# Patient Record
Sex: MALE | Race: WHITE
[De-identification: names, ages, dates, MRNs, and addresses within clinical notes are randomized per-mention and may not be internally consistent; named-entity substitution may affect disease eponyms.]

---

## 2017-01-01 ENCOUNTER — HOSPITAL (OUTPATIENT)
Dept: OTHER | Age: 82
End: 2017-01-01
Attending: INTERNAL MEDICINE

## 2017-03-22 PROCEDURE — 81003 URINALYSIS AUTO W/O SCOPE: CPT | Performed by: INTERNAL MEDICINE

## 2017-05-23 ENCOUNTER — HOSPITAL (OUTPATIENT)
Dept: OTHER | Age: 82
End: 2017-05-23
Attending: INTERNAL MEDICINE

## 2017-06-01 ENCOUNTER — HOSPITAL (OUTPATIENT)
Dept: OTHER | Age: 82
End: 2017-06-01
Attending: INTERNAL MEDICINE

## 2017-07-01 ENCOUNTER — HOSPITAL (OUTPATIENT)
Dept: OTHER | Age: 82
End: 2017-07-01
Attending: INTERNAL MEDICINE

## 2017-08-01 ENCOUNTER — HOSPITAL (OUTPATIENT)
Dept: OTHER | Age: 82
End: 2017-08-01
Attending: INTERNAL MEDICINE

## 2017-08-25 ENCOUNTER — HOSPITAL (OUTPATIENT)
Dept: OTHER | Age: 82
End: 2017-08-25
Attending: INTERNAL MEDICINE

## 2017-09-01 ENCOUNTER — HOSPITAL (OUTPATIENT)
Dept: OTHER | Age: 82
End: 2017-09-01
Attending: INTERNAL MEDICINE

## 2017-09-28 PROBLEM — R09.02 HYPOXIA: Status: ACTIVE | Noted: 2017-09-28

## 2017-09-28 PROBLEM — M35.1 OVERLAP SYNDROME (HCC): Status: ACTIVE | Noted: 2017-09-28

## 2018-03-23 PROCEDURE — 87086 URINE CULTURE/COLONY COUNT: CPT | Performed by: INTERNAL MEDICINE

## 2018-03-23 PROCEDURE — 81001 URINALYSIS AUTO W/SCOPE: CPT | Performed by: INTERNAL MEDICINE

## 2019-07-30 PROCEDURE — 36415 COLL VENOUS BLD VENIPUNCTURE: CPT | Performed by: INTERNAL MEDICINE

## 2019-07-30 PROCEDURE — 86480 TB TEST CELL IMMUN MEASURE: CPT | Performed by: INTERNAL MEDICINE

## 2019-09-13 PROBLEM — E78.2 MIXED HYPERLIPIDEMIA: Status: ACTIVE | Noted: 2019-09-13

## 2019-09-13 PROBLEM — I65.23 BILATERAL CAROTID ARTERY STENOSIS: Status: ACTIVE | Noted: 2019-09-13

## 2019-09-13 PROBLEM — Z95.2 HX OF AORTIC VALVE REPLACEMENT: Status: ACTIVE | Noted: 2019-09-13

## 2021-07-14 ENCOUNTER — TELEPHONE (OUTPATIENT)
Dept: OTHER | Age: 86
End: 2021-07-14

## 2021-07-19 ENCOUNTER — TELEPHONE (OUTPATIENT)
Dept: OTHER | Age: 86
End: 2021-07-19

## 2021-07-22 ENCOUNTER — TELEPHONE (OUTPATIENT)
Dept: OTHER | Age: 86
End: 2021-07-22

## 2021-08-03 ENCOUNTER — TELEPHONE (OUTPATIENT)
Dept: INTERNAL MEDICINE | Age: 86
End: 2021-08-03

## 2021-10-11 ENCOUNTER — HOSPITAL ENCOUNTER (OUTPATIENT)
Facility: HOSPITAL | Age: 86
Setting detail: OBSERVATION
Discharge: NURSING, REHAB OR LONG-TERM CARE | End: 2021-10-13
Attending: EMERGENCY MEDICINE | Admitting: HOSPITALIST
Payer: MEDICARE

## 2021-10-11 ENCOUNTER — APPOINTMENT (OUTPATIENT)
Dept: CT IMAGING | Facility: HOSPITAL | Age: 86
End: 2021-10-11
Attending: EMERGENCY MEDICINE
Payer: MEDICARE

## 2021-10-11 DIAGNOSIS — R33.8 ACUTE URINARY RETENTION: ICD-10-CM

## 2021-10-11 DIAGNOSIS — K62.5 RECTAL BLEEDING: Primary | ICD-10-CM

## 2021-10-11 DIAGNOSIS — D64.9 ANEMIA, UNSPECIFIED TYPE: ICD-10-CM

## 2021-10-11 PROBLEM — R79.89 AZOTEMIA: Status: ACTIVE | Noted: 2021-10-11

## 2021-10-11 PROCEDURE — 86900 BLOOD TYPING SEROLOGIC ABO: CPT | Performed by: EMERGENCY MEDICINE

## 2021-10-11 PROCEDURE — 96374 THER/PROPH/DIAG INJ IV PUSH: CPT

## 2021-10-11 PROCEDURE — 99285 EMERGENCY DEPT VISIT HI MDM: CPT

## 2021-10-11 PROCEDURE — 74177 CT ABD & PELVIS W/CONTRAST: CPT | Performed by: EMERGENCY MEDICINE

## 2021-10-11 PROCEDURE — 83540 ASSAY OF IRON: CPT | Performed by: HOSPITALIST

## 2021-10-11 PROCEDURE — 83550 IRON BINDING TEST: CPT | Performed by: HOSPITALIST

## 2021-10-11 PROCEDURE — 86901 BLOOD TYPING SEROLOGIC RH(D): CPT | Performed by: EMERGENCY MEDICINE

## 2021-10-11 PROCEDURE — 83690 ASSAY OF LIPASE: CPT | Performed by: EMERGENCY MEDICINE

## 2021-10-11 PROCEDURE — 86920 COMPATIBILITY TEST SPIN: CPT

## 2021-10-11 PROCEDURE — 81001 URINALYSIS AUTO W/SCOPE: CPT | Performed by: EMERGENCY MEDICINE

## 2021-10-11 PROCEDURE — 93005 ELECTROCARDIOGRAM TRACING: CPT

## 2021-10-11 PROCEDURE — C9113 INJ PANTOPRAZOLE SODIUM, VIA: HCPCS | Performed by: EMERGENCY MEDICINE

## 2021-10-11 PROCEDURE — 82728 ASSAY OF FERRITIN: CPT | Performed by: HOSPITALIST

## 2021-10-11 PROCEDURE — 96361 HYDRATE IV INFUSION ADD-ON: CPT

## 2021-10-11 PROCEDURE — 86850 RBC ANTIBODY SCREEN: CPT | Performed by: EMERGENCY MEDICINE

## 2021-10-11 PROCEDURE — 93010 ELECTROCARDIOGRAM REPORT: CPT

## 2021-10-11 PROCEDURE — 51702 INSERT TEMP BLADDER CATH: CPT

## 2021-10-11 PROCEDURE — 85025 COMPLETE CBC W/AUTO DIFF WBC: CPT | Performed by: EMERGENCY MEDICINE

## 2021-10-11 PROCEDURE — 80053 COMPREHEN METABOLIC PANEL: CPT | Performed by: EMERGENCY MEDICINE

## 2021-10-11 RX ORDER — LIDOCAINE HYDROCHLORIDE 20 MG/ML
10 JELLY TOPICAL ONCE
Status: COMPLETED | OUTPATIENT
Start: 2021-10-11 | End: 2021-10-11

## 2021-10-11 RX ORDER — FINASTERIDE 5 MG/1
5 TABLET, FILM COATED ORAL DAILY
Status: DISCONTINUED | OUTPATIENT
Start: 2021-10-11 | End: 2021-10-13

## 2021-10-11 RX ORDER — FLUTICASONE PROPIONATE 50 MCG
2 SPRAY, SUSPENSION (ML) NASAL DAILY
Status: DISCONTINUED | OUTPATIENT
Start: 2021-10-12 | End: 2021-10-13

## 2021-10-11 RX ORDER — TAMSULOSIN HYDROCHLORIDE 0.4 MG/1
0.4 CAPSULE ORAL DAILY
Status: DISCONTINUED | OUTPATIENT
Start: 2021-10-11 | End: 2021-10-11

## 2021-10-11 RX ORDER — ONDANSETRON 2 MG/ML
4 INJECTION INTRAMUSCULAR; INTRAVENOUS EVERY 6 HOURS PRN
Status: DISCONTINUED | OUTPATIENT
Start: 2021-10-11 | End: 2021-10-13

## 2021-10-11 RX ORDER — METOCLOPRAMIDE HYDROCHLORIDE 5 MG/ML
10 INJECTION INTRAMUSCULAR; INTRAVENOUS EVERY 8 HOURS PRN
Status: DISCONTINUED | OUTPATIENT
Start: 2021-10-11 | End: 2021-10-13

## 2021-10-11 RX ORDER — CETIRIZINE HYDROCHLORIDE 10 MG/1
10 TABLET ORAL DAILY
Status: DISCONTINUED | OUTPATIENT
Start: 2021-10-12 | End: 2021-10-13

## 2021-10-11 RX ORDER — TAMSULOSIN HYDROCHLORIDE 0.4 MG/1
0.4 CAPSULE ORAL 2 TIMES DAILY
Status: DISCONTINUED | OUTPATIENT
Start: 2021-10-12 | End: 2021-10-13

## 2021-10-11 RX ORDER — FUROSEMIDE 20 MG/1
20 TABLET ORAL EVERY OTHER DAY
COMMUNITY
End: 2021-11-08

## 2021-10-11 RX ORDER — SODIUM CHLORIDE, SODIUM LACTATE, POTASSIUM CHLORIDE, CALCIUM CHLORIDE 600; 310; 30; 20 MG/100ML; MG/100ML; MG/100ML; MG/100ML
INJECTION, SOLUTION INTRAVENOUS CONTINUOUS
Status: DISCONTINUED | OUTPATIENT
Start: 2021-10-11 | End: 2021-10-12

## 2021-10-11 RX ORDER — ATORVASTATIN CALCIUM 20 MG/1
20 TABLET, FILM COATED ORAL NIGHTLY
Status: DISCONTINUED | OUTPATIENT
Start: 2021-10-11 | End: 2021-10-13

## 2021-10-11 RX ORDER — SODIUM CHLORIDE 9 MG/ML
INJECTION, SOLUTION INTRAVENOUS CONTINUOUS
Status: DISCONTINUED | OUTPATIENT
Start: 2021-10-11 | End: 2021-10-12

## 2021-10-11 RX ORDER — FUROSEMIDE 20 MG/1
20 TABLET ORAL EVERY OTHER DAY
Status: DISCONTINUED | OUTPATIENT
Start: 2021-10-11 | End: 2021-10-13

## 2021-10-11 RX ORDER — LIDOCAINE HYDROCHLORIDE 20 MG/ML
JELLY TOPICAL
Status: COMPLETED
Start: 2021-10-11 | End: 2021-10-11

## 2021-10-11 RX ORDER — SODIUM CHLORIDE 9 MG/ML
INJECTION, SOLUTION INTRAVENOUS CONTINUOUS
Status: DISCONTINUED | OUTPATIENT
Start: 2021-10-11 | End: 2021-10-11

## 2021-10-11 NOTE — ED QUICK NOTES
Orders for admission, patient is aware of plan and ready to go upstairs. Any questions, please call ED LAURA Melgar  at extension [phone]   Vaccinated?  yes  Type of COVID test sent: RAPID COID  COVID Suspicion level: Low/High-LOW      Titratable drug(s) infusing

## 2021-10-11 NOTE — PROGRESS NOTES
NURSING ADMISSION NOTE      Patient admitted via Cart  Oriented to room. Safety precautions initiated. Bed in low position. Call light in reach. Pt AOx4, anxious and at times, tearful.  Pt refused relief with medications but, was open to emotional

## 2021-10-11 NOTE — H&P
[facility]    History and Physical     Chary Guan Patient Status:  Emergency    1932 MRN [id number]   Location [address] Attending Jb Mcghee MD   Hosp Day # 0 PCP Rolando Phillip MD     Chief Compla smoking history. He has never used smokeless tobacco. He reports current alcohol use. He reports that he does not use drugs. Family History:   Family History   Family history unknown:  Yes       Allergies:   No Known Allergies        Seasonal MCG/ACT Inhalation Aerosol, Inhale 2 Puffs into the lungs 2 (two) times daily. , Disp: 1 Inhaler, Rfl: 2  METOPROLOL TARTRATE 50 MG OR TABS, Take 25 mg by mouth 2 (two) times daily. , Disp: , Rfl:   VITAMIN D 2000 UNIT OR TABS, 1 po daily, Disp: 30, Rfl: 0 in the last 168 hours. No results for input(s): TROP, CK in the last 168 hours. Imaging: Imaging data reviewed in Epic.       ASSESSMENT / PLAN:     Guero Tinajero Is a a 80year old male who presents with anemia    Problem List / Diagnoses    An

## 2021-10-11 NOTE — ED INITIAL ASSESSMENT (HPI)
A/o x3,pt with two BM yesterday with dark color in stool,  And distended abd.  Denies pain to abd but reports bloating

## 2021-10-11 NOTE — ED PROVIDER NOTES
Patient Seen in: [facility] Emergency Department      History   Patient presents with:  Abdomen/Flank Pain    Stated Complaint: GI Blood    Subjective:   HPI    80year-old male complaint of rectal bleeding patient had 2 bowel movements with large a limits neck there is no lymphadenopathy or JVD lungs are clear cardiovascular exam shows regular rate and rhythm without murmurs abdomen soft there is moderate tenderness and distention of the lower abdomen rectal exam showed dark red maroon-colored stool BUN 42 hemoglobin 8.3 previous hemoglobin from 2019 was 14      CT ABDOMEN+PELVIS(CONTRAST ONLY)(CPT=74177)    Result Date: 10/11/2021  CONCLUSION:   1. Marked centrilobular emphysematous changes are noted.   2. Moderate abnormal bladder wall thickening w

## 2021-10-12 PROCEDURE — 83550 IRON BINDING TEST: CPT | Performed by: HOSPITALIST

## 2021-10-12 PROCEDURE — 80048 BASIC METABOLIC PNL TOTAL CA: CPT | Performed by: HOSPITALIST

## 2021-10-12 PROCEDURE — 83540 ASSAY OF IRON: CPT | Performed by: HOSPITALIST

## 2021-10-12 PROCEDURE — 94640 AIRWAY INHALATION TREATMENT: CPT

## 2021-10-12 PROCEDURE — 96361 HYDRATE IV INFUSION ADD-ON: CPT

## 2021-10-12 PROCEDURE — 30233N1 TRANSFUSION OF NONAUTOLOGOUS RED BLOOD CELLS INTO PERIPHERAL VEIN, PERCUTANEOUS APPROACH: ICD-10-PCS | Performed by: INTERNAL MEDICINE

## 2021-10-12 PROCEDURE — 85018 HEMOGLOBIN: CPT | Performed by: HOSPITALIST

## 2021-10-12 PROCEDURE — 85025 COMPLETE CBC W/AUTO DIFF WBC: CPT | Performed by: HOSPITALIST

## 2021-10-12 PROCEDURE — 82728 ASSAY OF FERRITIN: CPT | Performed by: HOSPITALIST

## 2021-10-12 PROCEDURE — 36430 TRANSFUSION BLD/BLD COMPNT: CPT

## 2021-10-12 RX ORDER — SODIUM CHLORIDE 9 MG/ML
INJECTION, SOLUTION INTRAVENOUS ONCE
Status: COMPLETED | OUTPATIENT
Start: 2021-10-12 | End: 2021-10-12

## 2021-10-12 NOTE — PLAN OF CARE
Pt is A&Ox4. Anxious and tearful at times. Wears glasses, upper/lower dentures. VSS, afebrile. Maintaining O2 sats >95% on 3L NC- baseline. ASHKAN- cpap at [place] [address]. Tele, NSR-ST. Last BM 10/10. NPO at midnight, ice chips. Huang in place, draining WDL.  Up SBA w/ c

## 2021-10-12 NOTE — PHYSICAL THERAPY NOTE
Order received for PT eval and chart reviewed. Attempted to see Pt this am for PT session, however, Pt adamantly refusing OOB activity and PT eval despite maximal encouragement and education on role of therapy.  Will continue to follow if patient participat

## 2021-10-12 NOTE — PROGRESS NOTES
[facility]    Progress Note     Annemarie Simon Patient Status:  Observation    1932 MRN [id number]   [facility] 5NW-A Attending Cindy Huynh MD   Hosp Day # 0 PCP Meri Shepherd MD     Chief Complaint: Patient presents w Oral Daily   • tamsulosin  0.4 mg Oral BID   • Umeclidinium Bromide  1 puff Inhalation Daily   • melatonin  5 mg Oral Nightly       ASSESSMENT / PLAN:        Chary Guan Is a a 80year old male who presents with anemia     Problem List / Diagnoses

## 2021-10-12 NOTE — OCCUPATIONAL THERAPY NOTE
Attempted to see patient for OT evaluation this am, however patient is adamantly refusing at this time despite extensive explanation regarding role and POC. Will continue to follow if patient becomes an active participant. RN notified.

## 2021-10-12 NOTE — PROGRESS NOTES
10/12/21 1739   Clinical Encounter Type   Visited With Patient   Continue Visiting No   Patient Spiritual Encounters   Spiritual Needs Pt. is fearful of medical outcomes, has guilt due to youthful indisgressions, and is concerned if his prayers he uses

## 2021-10-12 NOTE — DIETARY NOTE
[facility]    NUTRITION ASSESSMENT    Pt does not meet malnutrition criteria. NUTRITION INTERVENTION:    1. Meal and Snacks - Advance diet as able to general, monitor patient po intake. Encourage adequate po of appropriate diet.   2. Medical Food rib cage region and moderate depletion muscle mass temple region, clavicle region, thigh region and calf region      2.  Fluid Accumulation: none per RN documentation     NUTRITION PRESCRIPTION:   Calories: 3505-3780 calories/day (25-30 calories per kg)  Pr

## 2021-10-12 NOTE — PROGRESS NOTES
Problem: Rectal Bleeding     Data: Pt AOx4, anxious and at times, tearful.  on 3 L which is pt's baseline at home. NSR to ST on tele. Huang in place draining becki urine. No complaints of pain, nausea or vomiting.  Pt did complain of bloody stool two day

## 2021-10-12 NOTE — CONSULTS
[facility]    Report of Consultation    Collettruby Castanocollin Patient Status:  Observation    1932 MRN [id number]   [facility] 5NW-A Attending Janina Goltz, MD   Hosp Day # 0 PCP Brady Hardy MD     Date of Admission:  10/11 spray, Each Nare, Daily  •  furosemide (LASIX) tab 20 mg, 20 mg, Oral, QOD  •  cetirizine (ZYRTEC) tab 10 mg, 10 mg, Oral, Daily  •  tamsulosin (FLOMAX) cap 0.4 mg, 0.4 mg, Oral, BID  •  Umeclidinium Bromide (INCRUSE ELLIPTA) 62.5 MCG/INH inhaler 1 puff, 1 other lower urinary tract symptoms (LUTS)     Essential hypertension, benign     Congenital atresia and stenosis of aorta     Gingivitis     Acute sinusitis, unspecified     Obstructive sleep apnea (adult) (pediatric)     Shortness of breath     Overlap sy

## 2021-10-12 NOTE — CM/SW NOTE
10/12/21 1600   CM/SW Referral Data   Referral Source    Reason for Referral Discharge planning   Informant Patient; Spouse/Significant Other   Patient Info   Patient's Current Mental Status at Time of Assessment Alert; Oriented   Patient's

## 2021-10-13 ENCOUNTER — HOSPITAL ENCOUNTER (EMERGENCY)
Facility: HOSPITAL | Age: 86
Discharge: HOME | End: 2021-10-13
Attending: EMERGENCY MEDICINE
Payer: MEDICARE

## 2021-10-13 VITALS
WEIGHT: 113 LBS | RESPIRATION RATE: 22 BRPM | OXYGEN SATURATION: 98 % | DIASTOLIC BLOOD PRESSURE: 42 MMHG | HEART RATE: 101 BPM | TEMPERATURE: 98 F | SYSTOLIC BLOOD PRESSURE: 140 MMHG | HEIGHT: 66 IN | BODY MASS INDEX: 18.16 KG/M2

## 2021-10-13 VITALS
OXYGEN SATURATION: 94 % | RESPIRATION RATE: 20 BRPM | SYSTOLIC BLOOD PRESSURE: 123 MMHG | TEMPERATURE: 99 F | HEIGHT: 66 IN | DIASTOLIC BLOOD PRESSURE: 54 MMHG | BODY MASS INDEX: 18.18 KG/M2 | WEIGHT: 113.13 LBS | HEART RATE: 90 BPM

## 2021-10-13 DIAGNOSIS — K62.5 RECTAL BLEEDING: Primary | ICD-10-CM

## 2021-10-13 PROCEDURE — 96361 HYDRATE IV INFUSION ADD-ON: CPT

## 2021-10-13 PROCEDURE — 80048 BASIC METABOLIC PNL TOTAL CA: CPT | Performed by: HOSPITALIST

## 2021-10-13 PROCEDURE — 99283 EMERGENCY DEPT VISIT LOW MDM: CPT | Performed by: EMERGENCY MEDICINE

## 2021-10-13 PROCEDURE — 36415 COLL VENOUS BLD VENIPUNCTURE: CPT | Performed by: EMERGENCY MEDICINE

## 2021-10-13 PROCEDURE — 85025 COMPLETE CBC W/AUTO DIFF WBC: CPT | Performed by: HOSPITALIST

## 2021-10-13 PROCEDURE — 80053 COMPREHEN METABOLIC PANEL: CPT | Performed by: HOSPITALIST

## 2021-10-13 PROCEDURE — 85025 COMPLETE CBC W/AUTO DIFF WBC: CPT | Performed by: EMERGENCY MEDICINE

## 2021-10-13 NOTE — DISCHARGE PLANNING
NURSING DISCHARGE NOTE    Discharged Home via Ambulance. Accompanied by Support staff  Belongings Taken by patient/family. Discharge navigator complete. Discharge instructions reviewed with patient & family at bedside.   All questions & concerns addre

## 2021-10-13 NOTE — DISCHARGE SUMMARY
General Medicine Discharge Summary     Patient ID:  Momo Vitale  80year old  9/9/1932    Admit date: 10/11/2021    Discharge date and time: 10/13/2021      Attending Physician: Isidra Mustafa exacerbation on exam     Urinary Retention   - already on tamsulosin/finasteride  - s/p IFC with 900cc return; given degree of bladder dilation will need at least 1 week of bladder decompression w/ IFC   - d/w Urology, will set up for void trial in ~1 week mouth 2 (two) times daily. VITAMIN D 2000 UNIT OR TABS  1 po daily    MUCINEX 600 MG OR TB12  1 TABLET DAILY    Ascorbic Acid (VITAMIN C OR)  Take 1,000 mg by mouth 2 (two) times a day.     nystatin 518141 UNIT/ML Mouth/Throat Suspension  Take 5 mL (500,

## 2021-10-13 NOTE — OCCUPATIONAL THERAPY NOTE
Attempted to see pt for OT. Pt adamantly refusing OOB activity, stating \"Now that they gave me this catheter I don't have to ever get up again. \" Will re-attempt to see pt as appropriate and as able.

## 2021-10-13 NOTE — CM/SW NOTE
CM met with pt and daughter to discuss discharge plan. Pt will discharge to [facility]. . Lucero RN to call report to Leanna SANCHEZ # [phone]. MD updated on plan.     Tonia Pollock, MSN RN, [address]  X [phone]

## 2021-10-13 NOTE — PLAN OF CARE
Pt aox4. Anxious. Upper and lower dentures. VSS on 3L. Tele ST. EP. No rectal bleeding noted. Hgb 9.1 this AM, improved from yesterday. BM on 10/10. Huang in place due to urinary retention. Draining well. Pt to follow up with urology on Friday.  Up with sta interventions  Outcome: Progressing

## 2021-10-13 NOTE — CM/SW NOTE
CM spoke with Dianna Scott at [facility] re patient needing his portable O2 situation remedied. They will contact the patient's PCP( Dr Altagracia Hatfield) to get an order for portable tanks to be delivered to the home.  CM will relay this info to patient

## 2021-10-13 NOTE — ED INITIAL ASSESSMENT (HPI)
PT TO ED FROM [facility], PT Kelly Mike FOR BLOODY STOOLS TO RETURN TO FACILITY.  WHEN PT RETURNED TO FACILITY HE REPORTS HAVING SMALL BM THAT HAD DARK RED BLOOD, [facility] RETURNED PT TO FACILITY CRISPIN

## 2021-10-13 NOTE — ED QUICK NOTES
THIS RN TALKED TO [facility] REPORTS PT WAS IN THE BATHROOM AND WAS \"STILL BLEEDING\", PT IS NOT APPROPRIATE FOR ASSISTED LIVING AND NEEDS A SKILLED UNIT, WHICH THEY HAVE. RN TRANSFERRED TO THE SKILLED UNIT.  RN INFORMED THAT THEY HAVE NO ADMI

## 2021-10-13 NOTE — PLAN OF CARE
Pt is A&Ox4. Anxious and tearful at times. Wears glasses, upper/lower dentures. VSS, afebrile. Maintaining O2 sats >95% on 3L NC- baseline. ASHKAN- cpap at [facility]. Tele, NSR-ST. Last BM 10/10. Clear liquid diet. Huang in place, draining WDL.  Up SBA w/ cane or wa

## 2021-10-13 NOTE — PROGRESS NOTES
[facility]    Progress Note    Yadira Murrieta Patient Status:  Observation    1932 MRN [id number]   [facility] 5NW-A Attending Dangelo Dixon MD   Hosp Day # 0 PCP Bryanna Wan MD     Subjective:  Yadiralydia Murrieta absolutely necessary.     Ok to discharge home today from a GI standpoint.       Jamaal Wood MD  10/13/2021  8:57 AM

## 2021-10-13 NOTE — CM/SW NOTE
CM completed PCS and placed EAS on will call for transport back to [facility] assisted living.  &  to remain available and supportive for discharge planning needs.     Alpa Tafoya RN Case Manager [phone]

## 2021-10-13 NOTE — ED PROVIDER NOTES
Patient Seen in: [facility] Emergency Department      History   Patient presents with: Other    Stated Complaint: placement     Subjective:   HPI    Patient presents here with rectal bleeding.   The patient was just discharged from the hospital toda - 1.0 standard drinks      Comment: very occasional    Drug use: No             Review of Systems    Positive for stated complaint: placement   Other systems are as noted in HPI. Constitutional and vital signs reviewed.       All other systems reviewed and Abnormality         Status                     ---------                               -----------         ------                     CBC W/ DIFFERENTIAL[[id number]]          Abnormal            Final result                 Please view results for these te

## 2021-10-13 NOTE — ED QUICK NOTES
[facility] called for transport; eta 60-90, requested alternative transport due to time.  Alternative transports 60+ minutes

## 2021-10-14 ENCOUNTER — SNF VISIT (OUTPATIENT)
Dept: INTERNAL MEDICINE CLINIC | Age: 86
End: 2021-10-14

## 2021-10-14 DIAGNOSIS — I50.9 OTHER CONGESTIVE HEART FAILURE (HCC): ICD-10-CM

## 2021-10-14 DIAGNOSIS — Z97.8 CHRONIC INDWELLING FOLEY CATHETER: ICD-10-CM

## 2021-10-14 DIAGNOSIS — K92.2 GASTROINTESTINAL HEMORRHAGE, UNSPECIFIED GASTROINTESTINAL HEMORRHAGE TYPE: ICD-10-CM

## 2021-10-14 DIAGNOSIS — R06.02 SOB (SHORTNESS OF BREATH): ICD-10-CM

## 2021-10-14 DIAGNOSIS — D50.0 IRON DEFICIENCY ANEMIA DUE TO CHRONIC BLOOD LOSS: Primary | ICD-10-CM

## 2021-10-14 DIAGNOSIS — R33.8 URINARY RETENTION DUE TO BENIGN PROSTATIC HYPERPLASIA: ICD-10-CM

## 2021-10-14 DIAGNOSIS — N40.1 URINARY RETENTION DUE TO BENIGN PROSTATIC HYPERPLASIA: ICD-10-CM

## 2021-10-14 DIAGNOSIS — F41.8 ANXIETY ABOUT HEALTH: ICD-10-CM

## 2021-10-14 DIAGNOSIS — J44.9 CHRONIC OBSTRUCTIVE PULMONARY DISEASE, UNSPECIFIED COPD TYPE (HCC): ICD-10-CM

## 2021-10-14 PROCEDURE — 3074F SYST BP LT 130 MM HG: CPT | Performed by: NURSE PRACTITIONER

## 2021-10-14 PROCEDURE — 3078F DIAST BP <80 MM HG: CPT | Performed by: NURSE PRACTITIONER

## 2021-10-14 PROCEDURE — 1123F ACP DISCUSS/DSCN MKR DOCD: CPT | Performed by: NURSE PRACTITIONER

## 2021-10-14 PROCEDURE — 99310 SBSQ NF CARE HIGH MDM 45: CPT | Performed by: NURSE PRACTITIONER

## 2021-10-21 ENCOUNTER — SNF VISIT (OUTPATIENT)
Dept: INTERNAL MEDICINE CLINIC | Age: 86
End: 2021-10-21

## 2021-10-21 ENCOUNTER — TELEPHONE (OUTPATIENT)
Dept: INTERNAL MEDICINE | Age: 86
End: 2021-10-21

## 2021-10-21 DIAGNOSIS — R33.8 URINARY RETENTION DUE TO BENIGN PROSTATIC HYPERPLASIA: ICD-10-CM

## 2021-10-21 DIAGNOSIS — F41.8 ANXIETY ABOUT HEALTH: ICD-10-CM

## 2021-10-21 DIAGNOSIS — I50.9 OTHER CONGESTIVE HEART FAILURE (HCC): ICD-10-CM

## 2021-10-21 DIAGNOSIS — F32.0 CURRENT MILD EPISODE OF MAJOR DEPRESSIVE DISORDER WITHOUT PRIOR EPISODE (HCC): ICD-10-CM

## 2021-10-21 DIAGNOSIS — K92.2 GASTROINTESTINAL HEMORRHAGE, UNSPECIFIED GASTROINTESTINAL HEMORRHAGE TYPE: ICD-10-CM

## 2021-10-21 DIAGNOSIS — N40.1 URINARY RETENTION DUE TO BENIGN PROSTATIC HYPERPLASIA: ICD-10-CM

## 2021-10-21 DIAGNOSIS — J44.9 CHRONIC OBSTRUCTIVE PULMONARY DISEASE, UNSPECIFIED COPD TYPE (HCC): ICD-10-CM

## 2021-10-21 DIAGNOSIS — D50.0 IRON DEFICIENCY ANEMIA DUE TO CHRONIC BLOOD LOSS: Primary | ICD-10-CM

## 2021-10-21 DIAGNOSIS — R06.02 SOB (SHORTNESS OF BREATH): ICD-10-CM

## 2021-10-21 DIAGNOSIS — Z97.8 CHRONIC INDWELLING FOLEY CATHETER: ICD-10-CM

## 2021-10-21 PROCEDURE — 99309 SBSQ NF CARE MODERATE MDM 30: CPT | Performed by: NURSE PRACTITIONER

## 2021-10-21 PROCEDURE — 3078F DIAST BP <80 MM HG: CPT | Performed by: NURSE PRACTITIONER

## 2021-10-21 PROCEDURE — 3074F SYST BP LT 130 MM HG: CPT | Performed by: NURSE PRACTITIONER

## 2021-10-24 VITALS
DIASTOLIC BLOOD PRESSURE: 72 MMHG | TEMPERATURE: 98 F | BODY MASS INDEX: 17 KG/M2 | OXYGEN SATURATION: 97 % | HEART RATE: 95 BPM | WEIGHT: 103.63 LBS | SYSTOLIC BLOOD PRESSURE: 127 MMHG | RESPIRATION RATE: 18 BRPM

## 2021-10-24 VITALS
BODY MASS INDEX: 17 KG/M2 | DIASTOLIC BLOOD PRESSURE: 72 MMHG | TEMPERATURE: 98 F | RESPIRATION RATE: 19 BRPM | WEIGHT: 106.63 LBS | OXYGEN SATURATION: 96 % | HEART RATE: 99 BPM | SYSTOLIC BLOOD PRESSURE: 127 MMHG

## 2021-10-24 RX ORDER — SERTRALINE HYDROCHLORIDE 25 MG/1
25 TABLET, FILM COATED ORAL DAILY
COMMUNITY

## 2021-10-24 RX ORDER — LORAZEPAM 0.5 MG/1
0.5 TABLET ORAL EVERY 4 HOURS PRN
COMMUNITY

## 2021-10-24 NOTE — PROGRESS NOTES
Sofie Bedolla. APN encounter 10/14/21 2:45 pm (late entry)    Mr. Bronwyn Hanson seen in his room on the 2nd floor. Was admitted yesterday 10/13/21 for rehab at [facility] (UNIT [facility] AND 7). Lives with his wife next door in [place].     Jaspreet Nixon  : 1932 . 89 ye status: Former Smoker        Packs/day: 2.00        Years: 50.00        Pack years: 80        Start date: 1948        Quit date: 1985        Years since quittin.8      Smokeless tobacco: Never Used    Vaping Use      Vaping Use: Never used Oral Tab Take 10 mg by mouth daily. • Glucosamine-Chondroitin (MOVE FREE OR) Take 1 tablet by mouth daily.  (Patient not taking: Reported on 10/24/2021)     • Tiotropium Bromide Monohydrate 2.5 MCG/ACT Inhalation Aero Soln Inhale 2 puffs into the lungs Results   Component Value Date    WBC 10.8 10/13/2021    RBC 3.10 (L) 10/13/2021    HGB 9.8 (L) 10/13/2021    HCT 30.8 (L) 10/13/2021    MCV 99.4 10/13/2021    MCH 31.6 10/13/2021    MCHC 31.8 10/13/2021    RDW 13.5 10/13/2021    .0 10/13/2021     L

## 2021-10-25 NOTE — PROGRESS NOTES
Eldon Han. APN Encounter 10/21/21 230pm (late entry)    Mr. Lima Woodson seen in his room on the 2nd floor. He asked for time to discuss his medications, food selections, anxiety, SOB and wanting to return to his apartment next door.    He reports Emphysema lung ([facility] [address]    • Essential hypertension    • High blood pressure    • HYPERLIPIDEMIA      Past Surgical History:   Procedure Laterality Date   • BIOPSY OF PROSTATE,NEEDLE/PUNCH  2/11/03   • OTHER SURGICAL HISTORY      Bilateral eye implants   • OT last lab in the hospital 10/13/21. Assessment/Plan:     Rectal Bleed/ resolved. ? Hemorrhoids. One bloody stool since admission to [facility]. Monitor Stools. Weekly labs. Miralax - prevent constipation. Anemia:  Weekly labs.  Hospital lab -   HGB 9.8  10

## 2021-10-28 ENCOUNTER — SNF VISIT (OUTPATIENT)
Dept: INTERNAL MEDICINE CLINIC | Age: 86
End: 2021-10-28

## 2021-10-28 DIAGNOSIS — F32.0 CURRENT MILD EPISODE OF MAJOR DEPRESSIVE DISORDER WITHOUT PRIOR EPISODE (HCC): ICD-10-CM

## 2021-10-28 DIAGNOSIS — K64.9 HEMORRHOIDS, UNSPECIFIED HEMORRHOID TYPE: ICD-10-CM

## 2021-10-28 DIAGNOSIS — N40.1 URINARY RETENTION DUE TO BENIGN PROSTATIC HYPERPLASIA: ICD-10-CM

## 2021-10-28 DIAGNOSIS — K92.2 GASTROINTESTINAL HEMORRHAGE, UNSPECIFIED GASTROINTESTINAL HEMORRHAGE TYPE: Primary | ICD-10-CM

## 2021-10-28 DIAGNOSIS — R06.02 SOB (SHORTNESS OF BREATH): ICD-10-CM

## 2021-10-28 DIAGNOSIS — Z97.8 CHRONIC INDWELLING FOLEY CATHETER: ICD-10-CM

## 2021-10-28 DIAGNOSIS — D50.0 IRON DEFICIENCY ANEMIA DUE TO CHRONIC BLOOD LOSS: ICD-10-CM

## 2021-10-28 DIAGNOSIS — Z99.81 OXYGEN DEPENDENT: ICD-10-CM

## 2021-10-28 DIAGNOSIS — J44.9 CHRONIC OBSTRUCTIVE PULMONARY DISEASE, UNSPECIFIED COPD TYPE (HCC): ICD-10-CM

## 2021-10-28 DIAGNOSIS — R33.8 URINARY RETENTION DUE TO BENIGN PROSTATIC HYPERPLASIA: ICD-10-CM

## 2021-10-28 DIAGNOSIS — F41.8 ANXIETY ABOUT HEALTH: ICD-10-CM

## 2021-10-28 DIAGNOSIS — I50.9 OTHER CONGESTIVE HEART FAILURE (HCC): ICD-10-CM

## 2021-10-28 DIAGNOSIS — R53.81 PHYSICAL DECONDITIONING: ICD-10-CM

## 2021-10-28 PROCEDURE — 99309 SBSQ NF CARE MODERATE MDM 30: CPT | Performed by: NURSE PRACTITIONER

## 2021-10-31 VITALS
TEMPERATURE: 98 F | BODY MASS INDEX: 17 KG/M2 | DIASTOLIC BLOOD PRESSURE: 44 MMHG | RESPIRATION RATE: 18 BRPM | OXYGEN SATURATION: 96 % | WEIGHT: 105.19 LBS | SYSTOLIC BLOOD PRESSURE: 113 MMHG

## 2021-11-01 NOTE — PROGRESS NOTES
Benton Prakash. APN Encounter 10/28/21  330 pm (late entry)    Met with Mr. Mendoza Or in his room; is anxious. Has multiple complaints; not about his health.   Complains about the food, the care, the timing of his medication, his wife not wanting him SURGICAL HISTORY      Bilateral eye implants   • OTHER SURGICAL HISTORY  8/2/2010    Aortic Heart Valve replacement   • VALVE REPLACEMENT       Former Smoker. Quit smoking 36 yrs ago. Started smoking 73 yrs ago. 1 ppd.  Reported current occasional alcohol prevent constipation. Continues to refuse Colonoscopy    Anemia:  Weekly labs. Hospital lab -   HGB 9.8  10/13/21. Monitor for bleeding. COPD:  ProAir - prn -  keeps at bedside. Spiriva daily. Symbicort 160/4.5 - 2 puffs 2 x day.  Loratidine 10 mg d

## 2021-11-02 ENCOUNTER — SNF VISIT (OUTPATIENT)
Dept: INTERNAL MEDICINE CLINIC | Facility: SKILLED NURSING FACILITY | Age: 86
End: 2021-11-02

## 2021-11-02 DIAGNOSIS — Z99.81 OXYGEN DEPENDENT: ICD-10-CM

## 2021-11-02 DIAGNOSIS — F32.0 CURRENT MILD EPISODE OF MAJOR DEPRESSIVE DISORDER WITHOUT PRIOR EPISODE (HCC): ICD-10-CM

## 2021-11-02 DIAGNOSIS — F41.8 ANXIETY ABOUT HEALTH: ICD-10-CM

## 2021-11-02 DIAGNOSIS — R33.8 URINARY RETENTION DUE TO BENIGN PROSTATIC HYPERPLASIA: ICD-10-CM

## 2021-11-02 DIAGNOSIS — K92.2 GASTROINTESTINAL HEMORRHAGE, UNSPECIFIED GASTROINTESTINAL HEMORRHAGE TYPE: Primary | ICD-10-CM

## 2021-11-02 DIAGNOSIS — R06.02 SOB (SHORTNESS OF BREATH): ICD-10-CM

## 2021-11-02 DIAGNOSIS — J44.9 CHRONIC OBSTRUCTIVE PULMONARY DISEASE, UNSPECIFIED COPD TYPE (HCC): ICD-10-CM

## 2021-11-02 DIAGNOSIS — R53.81 PHYSICAL DECONDITIONING: ICD-10-CM

## 2021-11-02 DIAGNOSIS — I50.9 OTHER CONGESTIVE HEART FAILURE (HCC): ICD-10-CM

## 2021-11-02 DIAGNOSIS — K64.9 HEMORRHOIDS, UNSPECIFIED HEMORRHOID TYPE: ICD-10-CM

## 2021-11-02 DIAGNOSIS — R29.898 MUSCULAR DECONDITIONING: ICD-10-CM

## 2021-11-02 DIAGNOSIS — N40.1 URINARY RETENTION DUE TO BENIGN PROSTATIC HYPERPLASIA: ICD-10-CM

## 2021-11-02 DIAGNOSIS — Z97.8 CHRONIC INDWELLING FOLEY CATHETER: ICD-10-CM

## 2021-11-02 PROCEDURE — 99309 SBSQ NF CARE MODERATE MDM 30: CPT | Performed by: NURSE PRACTITIONER

## 2021-11-08 VITALS
WEIGHT: 105.81 LBS | SYSTOLIC BLOOD PRESSURE: 124 MMHG | DIASTOLIC BLOOD PRESSURE: 65 MMHG | RESPIRATION RATE: 17 BRPM | HEART RATE: 94 BPM | BODY MASS INDEX: 17 KG/M2

## 2021-11-09 ENCOUNTER — SNF VISIT (OUTPATIENT)
Dept: INTERNAL MEDICINE CLINIC | Age: 86
End: 2021-11-09

## 2021-11-09 VITALS
WEIGHT: 105 LBS | SYSTOLIC BLOOD PRESSURE: 119 MMHG | DIASTOLIC BLOOD PRESSURE: 68 MMHG | BODY MASS INDEX: 17 KG/M2 | OXYGEN SATURATION: 96 % | RESPIRATION RATE: 18 BRPM | TEMPERATURE: 98 F

## 2021-11-09 DIAGNOSIS — R53.81 PHYSICAL DECONDITIONING: ICD-10-CM

## 2021-11-09 DIAGNOSIS — Z99.81 OXYGEN DEPENDENT: ICD-10-CM

## 2021-11-09 DIAGNOSIS — I50.9 OTHER CONGESTIVE HEART FAILURE (HCC): ICD-10-CM

## 2021-11-09 DIAGNOSIS — F41.8 ANXIETY ABOUT HEALTH: ICD-10-CM

## 2021-11-09 DIAGNOSIS — K92.2 GASTROINTESTINAL HEMORRHAGE, UNSPECIFIED GASTROINTESTINAL HEMORRHAGE TYPE: Primary | ICD-10-CM

## 2021-11-09 DIAGNOSIS — R06.02 SOB (SHORTNESS OF BREATH): ICD-10-CM

## 2021-11-09 DIAGNOSIS — K64.9 HEMORRHOIDS, UNSPECIFIED HEMORRHOID TYPE: ICD-10-CM

## 2021-11-09 DIAGNOSIS — R33.8 URINARY RETENTION DUE TO BENIGN PROSTATIC HYPERPLASIA: ICD-10-CM

## 2021-11-09 DIAGNOSIS — N40.1 URINARY RETENTION DUE TO BENIGN PROSTATIC HYPERPLASIA: ICD-10-CM

## 2021-11-09 DIAGNOSIS — J44.9 CHRONIC OBSTRUCTIVE PULMONARY DISEASE, UNSPECIFIED COPD TYPE (HCC): ICD-10-CM

## 2021-11-09 DIAGNOSIS — Z97.8 CHRONIC INDWELLING FOLEY CATHETER: ICD-10-CM

## 2021-11-09 PROCEDURE — 99316 NF DSCHRG MGMT 30 MIN+: CPT | Performed by: NURSE PRACTITIONER

## 2021-11-09 NOTE — PROGRESS NOTES
Manuel Morgan. APN Encounter 11/2/21 2:15 pm (late entry)    Mr. Cheri Castillo seen sitting in his wheelchair in his room. He is anxious which is not new for him. Reports didn't eat very much of his lunch; just not hungry.   He is anxious about his medic Heart Valve replacement   • VALVE REPLACEMENT       Former Smoker. Quit smoking 36 yrs ago. Started smoking 73 yrs ago. 1 ppd. Reported current occasional alcohol use; no drugs. ALLERGIES: NKDA; seasonal allergies.   CODE STATUS:  Full Code, DNR  ADVANC 9.8  10/13/21. Monitor for s/s of  bleeding. COPD:  ProAir - prn -  keeps at bedside. Spiriva daily. Symbicort 160/4.5 - 2 puffs 2 x day. Loratidine 10 mg daily. Urinary Retention:  Chronic newby. Finasteride 5 mg daily.       Constipation:  M

## 2021-11-09 NOTE — PROGRESS NOTES
Crow Render 11/9/21 11 am - DISCHARGE SUMMARY    Mr. Cheri Castillo seen prior to discharge. RN states he will be transferred over to [address] between 11 and 2 pm.  Wife also lives next door.     Nestor Katiana, 59/1932, 80year old, male i reps with walker. Barrier is SOB. Needs assist for dressing. Needs assist for meals; set up and opening of containers. Needs 1 assist to transfer; high fall risk. Plan: Order Home Health: PT, OT. Skilled Nursing Facility Course  · Mr. Joyce bellamy

## 2021-11-14 ENCOUNTER — APPOINTMENT (OUTPATIENT)
Dept: GENERAL RADIOLOGY | Facility: HOSPITAL | Age: 86
End: 2021-11-14
Attending: EMERGENCY MEDICINE
Payer: MEDICARE

## 2021-11-14 ENCOUNTER — HOSPITAL ENCOUNTER (EMERGENCY)
Facility: HOSPITAL | Age: 86
Discharge: HOME | End: 2021-11-14
Attending: EMERGENCY MEDICINE
Payer: MEDICARE

## 2021-11-14 VITALS
TEMPERATURE: 98 F | OXYGEN SATURATION: 99 % | WEIGHT: 104.94 LBS | DIASTOLIC BLOOD PRESSURE: 41 MMHG | RESPIRATION RATE: 22 BRPM | SYSTOLIC BLOOD PRESSURE: 135 MMHG | BODY MASS INDEX: 17.92 KG/M2 | HEART RATE: 74 BPM | HEIGHT: 64 IN

## 2021-11-14 DIAGNOSIS — T83.511A URINARY TRACT INFECTION ASSOCIATED WITH INDWELLING URETHRAL CATHETER, INITIAL ENCOUNTER (HCC): Primary | ICD-10-CM

## 2021-11-14 DIAGNOSIS — N39.0 URINARY TRACT INFECTION ASSOCIATED WITH INDWELLING URETHRAL CATHETER, INITIAL ENCOUNTER (HCC): Primary | ICD-10-CM

## 2021-11-14 PROCEDURE — 87077 CULTURE AEROBIC IDENTIFY: CPT | Performed by: EMERGENCY MEDICINE

## 2021-11-14 PROCEDURE — 87186 SC STD MICRODIL/AGAR DIL: CPT | Performed by: EMERGENCY MEDICINE

## 2021-11-14 PROCEDURE — 80053 COMPREHEN METABOLIC PANEL: CPT | Performed by: EMERGENCY MEDICINE

## 2021-11-14 PROCEDURE — 81001 URINALYSIS AUTO W/SCOPE: CPT | Performed by: EMERGENCY MEDICINE

## 2021-11-14 PROCEDURE — 83605 ASSAY OF LACTIC ACID: CPT | Performed by: EMERGENCY MEDICINE

## 2021-11-14 PROCEDURE — 85025 COMPLETE CBC W/AUTO DIFF WBC: CPT | Performed by: EMERGENCY MEDICINE

## 2021-11-14 PROCEDURE — 99284 EMERGENCY DEPT VISIT MOD MDM: CPT

## 2021-11-14 PROCEDURE — 71045 X-RAY EXAM CHEST 1 VIEW: CPT | Performed by: EMERGENCY MEDICINE

## 2021-11-14 PROCEDURE — 96365 THER/PROPH/DIAG IV INF INIT: CPT

## 2021-11-14 PROCEDURE — 87086 URINE CULTURE/COLONY COUNT: CPT | Performed by: EMERGENCY MEDICINE

## 2021-11-14 RX ORDER — CEPHALEXIN 500 MG/1
500 CAPSULE ORAL 4 TIMES DAILY
Qty: 40 CAPSULE | Refills: 0 | Status: SHIPPED | OUTPATIENT
Start: 2021-11-14 | End: 2021-11-24

## 2021-11-14 NOTE — ED PROVIDER NOTES
Patient Seen in: [facility] Emergency Department      History   Patient presents with:  Altered Mental Status    Stated Complaint: Lethargic, cloudy urine    Subjective:   HPI    66-year-old male who presents to the emergency department complaining systems reviewed and negative except as noted above.     Physical Exam     ED Triage Vitals [11/14/21 1326]   /67   Pulse 86   Resp 19   Temp 98 °F (36.7 °C)   Temp src Tympanic   SpO2 99 %   O2 Device None (Room air)       Current:/67   Pulse 7 components within normal limits   CBC W/ DIFFERENTIAL - Abnormal; Notable for the following components:    RBC 3.27 (*)     HGB 9.3 (*)     HCT 31.3 (*)     MCHC 29.7 (*)     Lymphocyte Absolute 0.71 (*)     All other components within normal limits   LACT pm    Follow-up:  Brian Price, [address]  [phone]    Schedule an appointment as soon as possible for a visit in 1 day            Medications Prescribed:  Current Discharge Medication List    START taking these me

## 2021-11-14 NOTE — ED INITIAL ASSESSMENT (HPI)
Pt has been confused recently home health aid notes cloudy urine was suppose to be changed today. On o2 at home.

## (undated) NOTE — LETTER
[facility] [facility] [place]OD [place]ONENTS      In the course of your treatment, it may become necessary to administer a transfusion of blood or blood components.  This form provides basic information concerning this proc alternatives to you if it has not already been done. I,Tanvir Cook, have read/had read to me the above. I understand the matters bearing on the decision whether or not to authorize a transfusion of blood or blood components.  I have no questions wh

## (undated) NOTE — IP AVS SNAPSHOT
[address]            (For Outpatient Use Only) Initial Admit Date: 10/11/2021   Inpt/Obs Admit Date: Inpt: N/A / Obs: 10/11/21   Discharge Date:    Geoff Loaiza:  [de-identified]   MRN: [de-identified]   CSN: [id number]   CEID: [id number] Subscriber: SELF   TERTIARY INSURANCE   Payor:  Plan:    Group Number:  Insurance Type:    Subscriber Name:  Subscriber :    Subscriber ID:  Pt Rel to Subscriber:    Hospital Account Financial Class: Medicare    2021